# Patient Record
Sex: MALE | Race: BLACK OR AFRICAN AMERICAN | NOT HISPANIC OR LATINO | Employment: FULL TIME | URBAN - METROPOLITAN AREA
[De-identification: names, ages, dates, MRNs, and addresses within clinical notes are randomized per-mention and may not be internally consistent; named-entity substitution may affect disease eponyms.]

---

## 2022-01-01 ENCOUNTER — APPOINTMENT (OUTPATIENT)
Dept: RADIOLOGY | Facility: HOSPITAL | Age: 62
End: 2022-01-01
Attending: EMERGENCY MEDICINE
Payer: COMMERCIAL

## 2022-01-01 ENCOUNTER — HOSPITAL ENCOUNTER (INPATIENT)
Facility: HOSPITAL | Age: 62
LOS: 1 days | End: 2022-01-31
Attending: EMERGENCY MEDICINE | Admitting: INTERNAL MEDICINE
Payer: COMMERCIAL

## 2022-01-01 VITALS
SYSTOLIC BLOOD PRESSURE: 56 MMHG | HEIGHT: 70 IN | HEART RATE: 42 BPM | BODY MASS INDEX: 25.05 KG/M2 | OXYGEN SATURATION: 91 % | DIASTOLIC BLOOD PRESSURE: 32 MMHG | WEIGHT: 175 LBS

## 2022-01-01 DIAGNOSIS — I46.9 CARDIAC ARREST (H): ICD-10-CM

## 2022-01-01 DIAGNOSIS — N17.9 ACUTE RENAL FAILURE, UNSPECIFIED ACUTE RENAL FAILURE TYPE (H): ICD-10-CM

## 2022-01-01 DIAGNOSIS — I46.9 PEA (PULSELESS ELECTRICAL ACTIVITY) (H): ICD-10-CM

## 2022-01-01 DIAGNOSIS — E86.0 DEHYDRATION: ICD-10-CM

## 2022-01-01 LAB
ABO/RH(D): NORMAL
ACANTHOCYTES BLD QL SMEAR: SLIGHT
ANION GAP SERPL CALCULATED.3IONS-SCNC: >40 MMOL/L (ref 5–18)
ANTIBODY SCREEN: NEGATIVE
BASE EXCESS BLDA CALC-SCNC: ABNORMAL MMOL/L
BASO STIPL BLD QL SMEAR: PRESENT
BASOPHILS # BLD MANUAL: 0 10E3/UL (ref 0–0.2)
BASOPHILS NFR BLD MANUAL: 0 %
BLD PROD TYP BPU: NORMAL
BLD PROD TYP BPU: NORMAL
BLOOD COMPONENT TYPE: NORMAL
BLOOD COMPONENT TYPE: NORMAL
BUN SERPL-MCNC: 30 MG/DL (ref 8–22)
BURR CELLS BLD QL SMEAR: ABNORMAL
CALCIUM SERPL-MCNC: 8.2 MG/DL (ref 8.5–10.5)
CHLORIDE BLD-SCNC: 94 MMOL/L (ref 98–107)
CO2 SERPL-SCNC: <6 MMOL/L (ref 22–31)
CODING SYSTEM: NORMAL
CODING SYSTEM: NORMAL
COHGB MFR BLD: 99.9 % (ref 96–97)
CREAT SERPL-MCNC: 3.07 MG/DL (ref 0.7–1.3)
CROSSMATCH: NORMAL
D DIMER PPP FEU-MCNC: >20 UG/ML FEU (ref 0–0.5)
ELLIPTOCYTES BLD QL SMEAR: SLIGHT
EOSINOPHIL # BLD MANUAL: 0 10E3/UL (ref 0–0.7)
EOSINOPHIL NFR BLD MANUAL: 0 %
ERYTHROCYTE [DISTWIDTH] IN BLOOD BY AUTOMATED COUNT: 16.1 % (ref 10–15)
GFR SERPL CREATININE-BSD FRML MDRD: 22 ML/MIN/1.73M2
GLUCOSE BLD-MCNC: 135 MG/DL (ref 70–125)
GLUCOSE BLDC GLUCOMTR-MCNC: 139 MG/DL (ref 70–99)
HCO3 BLD-SCNC: ABNORMAL MMOL/L
HCT VFR BLD AUTO: 11.7 % (ref 40–53)
HGB BLD-MCNC: 3.2 G/DL (ref 13.3–17.7)
HOLD SPECIMEN: NORMAL
ISSUE DATE AND TIME: NORMAL
ISSUE DATE AND TIME: NORMAL
LACTATE SERPL-SCNC: >24 MMOL/L (ref 0.7–2)
LYMPHOCYTES # BLD MANUAL: 1.6 10E3/UL (ref 0.8–5.3)
LYMPHOCYTES NFR BLD MANUAL: 54 %
MCH RBC QN AUTO: 35.2 PG (ref 26.5–33)
MCHC RBC AUTO-ENTMCNC: 27.4 G/DL (ref 31.5–36.5)
MCV RBC AUTO: 129 FL (ref 78–100)
MONOCYTES # BLD MANUAL: 0.1 10E3/UL (ref 0–1.3)
MONOCYTES NFR BLD MANUAL: 4 %
MYELOCYTES # BLD MANUAL: 0.1 10E3/UL
MYELOCYTES NFR BLD MANUAL: 2 %
NEUTROPHILS # BLD MANUAL: 1.2 10E3/UL (ref 1.6–8.3)
NEUTROPHILS NFR BLD MANUAL: 40 %
NRBC # BLD AUTO: 0.1 10E3/UL
NRBC BLD MANUAL-RTO: 5 %
OXYHGB MFR BLD: 96.8 % (ref 96–97)
PCO2 BLD: 34 MM HG (ref 35–45)
PH BLD: <6.82 [PH] (ref 7.37–7.44)
PLAT MORPH BLD: ABNORMAL
PLATELET # BLD AUTO: 44 10E3/UL (ref 150–450)
PO2 BLD: 399 MM HG (ref 80–90)
POLYCHROMASIA BLD QL SMEAR: SLIGHT
POTASSIUM BLD-SCNC: 5.8 MMOL/L (ref 3.5–5)
RBC # BLD AUTO: 0.91 10E6/UL (ref 4.4–5.9)
RBC MORPH BLD: ABNORMAL
SODIUM SERPL-SCNC: 140 MMOL/L (ref 136–145)
SPECIMEN EXPIRATION DATE: NORMAL
TEMPERATURE: 37 DEGREES C
TROPONIN I SERPL-MCNC: 0.06 NG/ML (ref 0–0.29)
UNIT ABO/RH: NORMAL
UNIT ABO/RH: NORMAL
UNIT NUMBER: NORMAL
UNIT NUMBER: NORMAL
UNIT STATUS: NORMAL
UNIT STATUS: NORMAL
UNIT TYPE ISBT: 5100
UNIT TYPE ISBT: 5100
WBC # BLD AUTO: 2.9 10E3/UL (ref 4–11)

## 2022-01-01 PROCEDURE — 258N000003 HC RX IP 258 OP 636: Performed by: EMERGENCY MEDICINE

## 2022-01-01 PROCEDURE — 999N000157 HC STATISTIC RCP TIME EA 10 MIN

## 2022-01-01 PROCEDURE — 71045 X-RAY EXAM CHEST 1 VIEW: CPT

## 2022-01-01 PROCEDURE — 86901 BLOOD TYPING SEROLOGIC RH(D): CPT | Performed by: EMERGENCY MEDICINE

## 2022-01-01 PROCEDURE — 99285 EMERGENCY DEPT VISIT HI MDM: CPT | Mod: 25

## 2022-01-01 PROCEDURE — 36415 COLL VENOUS BLD VENIPUNCTURE: CPT | Performed by: EMERGENCY MEDICINE

## 2022-01-01 PROCEDURE — 120N000001 HC R&B MED SURG/OB

## 2022-01-01 PROCEDURE — 86850 RBC ANTIBODY SCREEN: CPT | Performed by: EMERGENCY MEDICINE

## 2022-01-01 PROCEDURE — 92950 HEART/LUNG RESUSCITATION CPR: CPT

## 2022-01-01 PROCEDURE — 83605 ASSAY OF LACTIC ACID: CPT | Performed by: EMERGENCY MEDICINE

## 2022-01-01 PROCEDURE — 80048 BASIC METABOLIC PNL TOTAL CA: CPT | Performed by: EMERGENCY MEDICINE

## 2022-01-01 PROCEDURE — 82805 BLOOD GASES W/O2 SATURATION: CPT | Performed by: EMERGENCY MEDICINE

## 2022-01-01 PROCEDURE — 96368 THER/DIAG CONCURRENT INF: CPT

## 2022-01-01 PROCEDURE — 96365 THER/PROPH/DIAG IV INF INIT: CPT

## 2022-01-01 PROCEDURE — 5A1221J PERFORMANCE OF CARDIAC OUTPUT, CONTINUOUS, AUTOMATED: ICD-10-PCS | Performed by: EMERGENCY MEDICINE

## 2022-01-01 PROCEDURE — 84484 ASSAY OF TROPONIN QUANT: CPT | Performed by: EMERGENCY MEDICINE

## 2022-01-01 PROCEDURE — 85027 COMPLETE CBC AUTOMATED: CPT | Performed by: EMERGENCY MEDICINE

## 2022-01-01 PROCEDURE — 250N000009 HC RX 250: Performed by: EMERGENCY MEDICINE

## 2022-01-01 PROCEDURE — 5A1935Z RESPIRATORY VENTILATION, LESS THAN 24 CONSECUTIVE HOURS: ICD-10-PCS | Performed by: EMERGENCY MEDICINE

## 2022-01-01 PROCEDURE — 250N000011 HC RX IP 250 OP 636: Performed by: EMERGENCY MEDICINE

## 2022-01-01 PROCEDURE — 86923 COMPATIBILITY TEST ELECTRIC: CPT

## 2022-01-01 PROCEDURE — 85379 FIBRIN DEGRADATION QUANT: CPT | Performed by: EMERGENCY MEDICINE

## 2022-01-01 PROCEDURE — 94002 VENT MGMT INPAT INIT DAY: CPT

## 2022-01-01 PROCEDURE — 3E033XZ INTRODUCTION OF VASOPRESSOR INTO PERIPHERAL VEIN, PERCUTANEOUS APPROACH: ICD-10-PCS | Performed by: EMERGENCY MEDICINE

## 2022-01-01 PROCEDURE — P9016 RBC LEUKOCYTES REDUCED: HCPCS

## 2022-01-01 RX ORDER — NOREPINEPHRINE BITARTRATE 0.02 MG/ML
.01-.6 INJECTION, SOLUTION INTRAVENOUS CONTINUOUS
Status: DISCONTINUED | OUTPATIENT
Start: 2022-01-01 | End: 2022-01-01 | Stop reason: HOSPADM

## 2022-01-01 RX ORDER — PIPERACILLIN SODIUM, TAZOBACTAM SODIUM 3; .375 G/15ML; G/15ML
3.38 INJECTION, POWDER, LYOPHILIZED, FOR SOLUTION INTRAVENOUS ONCE
Status: DISCONTINUED | OUTPATIENT
Start: 2022-01-01 | End: 2022-01-01 | Stop reason: HOSPADM

## 2022-01-01 RX ORDER — LIDOCAINE 40 MG/G
CREAM TOPICAL
Status: DISCONTINUED | OUTPATIENT
Start: 2022-01-01 | End: 2022-01-01 | Stop reason: HOSPADM

## 2022-01-01 RX ORDER — CEFAZOLIN SODIUM 1 G/50ML
1750 SOLUTION INTRAVENOUS ONCE
Status: DISCONTINUED | OUTPATIENT
Start: 2022-01-01 | End: 2022-01-01 | Stop reason: HOSPADM

## 2022-01-01 RX ADMIN — NOREPINEPHRINE BITARTRATE 4 MG/250 ML (16 MCG/ML) IN 0.9 % NACL IV 0.4 MCG/KG/MIN: at 07:39

## 2022-01-01 RX ADMIN — VASOPRESSIN 2.4 UNITS/HR: 20 INJECTION INTRAVENOUS at 07:36

## 2022-01-01 RX ADMIN — EPINEPHRINE 0.18 MCG/KG/MIN: 1 INJECTION INTRAMUSCULAR; INTRAVENOUS; SUBCUTANEOUS at 08:04

## 2022-01-01 RX ADMIN — SODIUM CHLORIDE 2000 ML: 9 INJECTION, SOLUTION INTRAVENOUS at 07:39

## 2022-01-01 ASSESSMENT — MIFFLIN-ST. JEOR: SCORE: 1595.04

## 2022-01-01 ASSESSMENT — ACTIVITIES OF DAILY LIVING (ADL)
ADLS_ACUITY_SCORE: 12

## 2022-01-31 PROBLEM — I46.9 PEA (PULSELESS ELECTRICAL ACTIVITY) (H): Status: ACTIVE | Noted: 2022-01-01

## 2022-01-31 PROBLEM — I46.9 CARDIAC ARREST (H): Status: ACTIVE | Noted: 2022-01-01

## 2022-01-31 PROBLEM — N17.9 ACUTE RENAL FAILURE, UNSPECIFIED ACUTE RENAL FAILURE TYPE (H): Status: ACTIVE | Noted: 2022-01-01

## 2022-01-31 PROBLEM — E86.0 DEHYDRATION: Status: ACTIVE | Noted: 2022-01-01

## 2022-01-31 NOTE — DEATH PRONOUNCEMENT
Death pronouncement note.    08 40: Patient pronounced dead.    Prior to this time patient with low blood pressure had maxed out on vasopressin, epinephrine, and Levophed.  Discussion with wife led to a DNR decision.  Not DNI, as patient is already intubated in the field.  It was elected not to continue further interventions as patient has deteriorated to asystole following low blood pressure of 50 systolic.    1.  Proximate cause of death: Hypotension/asystole.  2.  Secondary causes include acute renal failure, acute acidosis, severely elevated lactate, severe dehydration.  Pancytopenia also noted with the low hemoglobin at 3.2.

## 2022-01-31 NOTE — PROGRESS NOTES
Spiritual Health Note    Spiritual Assessment:     called to ED to provide spiritual/emotional support to patient's family, including wife Ludy, daughter Amada, and son Lane as Elpidio .       They shared family/life history; Elpidio loved sports and had a great sense of humor. He and Ludy met at [employer]. 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 He had a very good friend die a couple of weeks ago. They have three children.     Patient comes from [place] background. Elpidio is Yarsani and Ludy Bahai. Offered prayer of commendation/blessing, which family appreciated.     Care Provided:    Introduced self and role of Spiritual Care     Empathic listening and presence     Helped family in processing of emotions     Facilitated storytelling and life review     Offered prayer     Plan of Care: No further plans to visit at this time, but  staff available if further needs arise.       Chaplain Steve Newman MDiv, [facility]

## 2022-01-31 NOTE — PROGRESS NOTES
Pt's spouse at bedside, informed of pt being in critical condition and likely will keep decompensating given the current amount of medication needed to keep his B/P up and his critically low lab values.  Pt's spouse stated that she doesn't want pt to go through another code blue as she doesn't want to see him suffer.  Writer informed MD of decision.  Pt now DNR.

## 2022-01-31 NOTE — ED NOTES
Bed: JNED-01  Expected date: 1/31/22  Expected time: 6:41 AM  Means of arrival: Ambulance  Comments:  [place] - witnessed arrest by wife at 0540 and CPR started, PEA

## 2022-01-31 NOTE — PROGRESS NOTES
PT arrived to the ED in cardiac arrest, and was already intubated with 7.5 ETT secured 25 cm at the teeth with equal breath sounds. ROSC was achieved and PT placed on mechanical ventilator. PT's spouse was updated on his current condition and prognosis. The decision was made to make the PT DNR. PT  at 0840.    Deandre Goodson, RT

## 2022-01-31 NOTE — ED PROVIDER NOTES
EMERGENCY DEPARTMENT ENCOUNTER      NAME: Elpidio Plata  AGE: 61 year old male  YOB: 1960  MRN: [id number]  EVALUATION DATE & TIME: 2022  6:47 AM    PCP: Kenzie Simpson    ED PROVIDER: Ricky Michelle M.D.      No chief complaint on file.    Acute cardiac arrest.    FINAL IMPRESSION:  1.  Acute cardiac arrest.   2.  Acute PEA.  3.  Acute hypothermia.  4.  Acute dehydration.  5.  Acute renal failure.    ED COURSE & MEDICAL DECISION MAKIN:44 AM Patient arrived via EMS in cardiac arrest, SHEREE on. I met with the patient to gather history and to perform my initial exam. PPE worn: cloth mask.  Patient in pulseless electrical activity cardiac arrest.  CPR and ACLS protocols ongoing.  Multiple medications from medics including D10, bicarbonate, epinephrine.  Further medications here include more epinephrine, bicarbonate, an amp of D50.  Patient started on Levophed for blood pressure support.  This is at its maximum.  Vasopressin was started as well as epinephrine per pharmacy.  Intensivist and hospitalist paged.  Patient hypothermic with a rectal temperature of 90 degrees.  Bicarbonate at less than 6.  7:38 AM Spoke to Dr. Arce, intensivist.  Okay with ICU admission if an ICU bed will become available.  7:46 AM Spoke with patient's wife.  Notified by the lab that his bicarbonate is less than 6 and lactate level greater than 24.0.  8:00 AM I spoke with Dr. Shoemaker with the hospitalist service who agrees to admit the patient to the intensivist..    8:29 AM.  Patient with maximum amounts of Levophed, epinephrine, and vasopressin.  Blood pressures are still falling.  Now approximately 50 systolic.  Discussed CODE STATUS with wife.  She is in favor of not doing anything further such as CPR, shock and so forth.  Patient of course already intubated.  8:40 AM.  Blood pressure deteriorating despite maximum doses of pressors.  Currently 50 systolic.  Shortly thereafter patient went into  asystole.  No return of spontaneous circulation.  Patient pronounced  at 08 40.  I have paged phone calls out to the admitting intensivist and the primary care doctor.    Pertinent Labs & Imaging studies reviewed. (See chart for details)      61 year old male presents to the Emergency Department for evaluation of cardiac arrest.    At the conclusion of the encounter I discussed the results of all of the tests and the disposition. The questions were answered. The patient or family acknowledged understanding and was agreeable with the care plan.        minutes of critical care time: 30 minutes.    MEDICATIONS GIVEN IN THE EMERGENCY:  Medications   vasopressin (VASOSTRICT) 20 Units in sodium chloride 0.9 % 100 mL standard conc infusion (has no administration in time range)   vasopressin (VASOSTRICT) 20 Units in sodium chloride 0.9 % 100 mL standard conc infusion (has no administration in time range)       NEW PRESCRIPTIONS STARTED AT TODAY'S ER VISIT  New Prescriptions    No medications on file          =================================================================    HPI    Patient information was obtained from: EMS    Use of : N/A         Elpidio Plata is a 61 year old male with a pertinent history of HTN who presents to this ED via EMS for evaluation of cardiac arrest.    Per EMS, patient was watching TV with his wife when he collapsed and went into cardiac arrest at 5:40 AM this morning. Wife started CPR and EMS arrived, found the patient in PEA, and started compressions at 5:44 AM. ROSC achieved at 6:22 AM for about 5-8 minutes before the patient returned to PEA. EMS gave the patient 4 rounds of epinephrine. Patient also given Bicarb and 500cc of D10 as blood sugar was 30.  I suspect the patient may have been down for a longer period of time than estimated given his temperature is 90 degrees rectal.  Patient arrives still in PEA, SHEREE on. IO established in right lower leg.       REVIEW  OF SYSTEMS   Review of Systems   Unable to perform ROS: Patient unresponsive        PAST MEDICAL HISTORY:  No past medical history on file.    PAST SURGICAL HISTORY:  No past surgical history on file.        CURRENT MEDICATIONS:    atorvastatin (LIPITOR) 40 MG tablet        ALLERGIES:  No Known Allergies    FAMILY HISTORY:  No family history on file.    SOCIAL HISTORY:   Social History     Socioeconomic History     Marital status:      Spouse name: Not on file     Number of children: Not on file     Years of education: Not on file     Highest education level: Not on file   Occupational History     Not on file   Tobacco Use     Smoking status: Not on file     Smokeless tobacco: Not on file   Substance and Sexual Activity     Alcohol use: Not on file     Drug use: Not on file     Sexual activity: Not on file   Other Topics Concern     Not on file   Social History Narrative     Not on file     Social Determinants of Health     Financial Resource Strain: Not on file   Food Insecurity: Not on file   Transportation Needs: Not on file   Physical Activity: Not on file   Stress: Not on file   Social Connections: Not on file   Intimate Partner Violence: Not on file   Housing Stability: Not on file       VITALS:  Wt 79.4 kg (175 lb)   BMI 24.41 kg/m      PHYSICAL EXAM    Vital Signs:  Wt 79.4 kg (175 lb)   BMI 24.41 kg/m    General:  On entering the room  is in no apparent distress.    Neck:  Neck supple with full range of motion and nontender.    Back:  Back and spine are nontender.  No costovertebral angle tenderness.    HEENT:  Oropharynx clear with moist mucous membranes.  HEENT unremarkable.  Dilated pupils bilaterally.  Unable to assess extraocular movements or pupil reactivity--they appear to be nonreactive.  Pulmonary:  Chest clear to auscultation without rhonchi rales or wheezing.    Cardiovascular:  Cardiac regular rate and rhythm without murmurs rubs or gallops.  PEA is replaced by normal sinus rhythm in  the seventies.  Abdomen:  Abdomen soft nontender.  There is no rebound or guarding.    Muskuloskeletal:  he moves all 4 without any difficulty and has normal neurovascular exams.  Extremities without clubbing, cyanosis, or edema.  Legs and calves are nontender.    Neuro:  he is alert and oriented ×3 and moves all extremities symmetrically.    Psych:  Normal affect.    Skin:  Unremarkable and warm and dry.       LAB:  All pertinent labs reviewed and interpreted.  Labs Ordered and Resulted from Time of ED Arrival to Time of ED Departure   BASIC METABOLIC PANEL - Abnormal       Result Value    Sodium 140      Potassium 5.8 (*)     Chloride 94 (*)     Carbon Dioxide (CO2) <6 (*)     Anion Gap >40 (*)     Urea Nitrogen 30 (*)     Creatinine 3.07 (*)     Calcium 8.2 (*)     Glucose 135 (*)     GFR Estimate 22 (*)    GLUCOSE BY METER - Abnormal    GLUCOSE BY METER POCT 139 (*)    LACTIC ACID WHOLE BLOOD - Abnormal    Lactic Acid >24.0 (*)    TROPONIN I - Normal    Troponin I 0.06     CBC WITH PLATELETS AND DIFFERENTIAL   BLOOD GAS ARTERIAL   INFLUENZA A/B & SARS-COV2 PCR MULTIPLEX   D DIMER QUANTITATIVE       RADIOLOGY:  Reviewed all pertinent imaging. Please see official radiology report.  XR Chest Port 1 View    (Results Pending)              EKG:    Normal sinus rhythm 62, right bundle-branch block, left anterior fascicular block.  First-degree AV block.  Similar to previous EKG of 10/26/2020.    I have independently reviewed and interpreted the EKG(s) documented above.    PROCEDURES:         I, Effie Cornejo, am serving as a scribe to document services personally performed by Dr. Michelle based on my observation and the provider's statements to me. I, Ricky Michelle MD attest that Effie Cornejo is acting in a scribe capacity, has observed my performance of the services and has documented them in accordance with my direction.    Ricky Michelle M.D.  Emergency Medicine  [facility]  EMERGENCY DEPARTMENT  [address] [place] [id number]  [phone]  Dept: [phone]       Ricky Michelle MD  01/31/22 0804       Ricky Michelle MD  01/31/22 0830       Ricky Michelle MD  01/31/22 0842       Ricky Michelle MD  01/31/22 0874

## 2022-01-31 NOTE — ED NOTES
Pt arrives in cardiac arrest at 0644 via EMS. Armani on upon arrival. See code chart for record.     Pt was a witness arrest by wife. EMS found him in PEA. 2 rounds of EPI given, ROSC achieved at 0622 for about 10-12 mins before Pt went back in PEA. 2 more rounds of EPI given, Bicarb, and 500cc D10. BS was 30. No known hx.